# Patient Record
Sex: MALE | Race: WHITE | HISPANIC OR LATINO | RURAL
[De-identification: names, ages, dates, MRNs, and addresses within clinical notes are randomized per-mention and may not be internally consistent; named-entity substitution may affect disease eponyms.]

---

## 2021-04-07 ENCOUNTER — OFFICE VISIT (OUTPATIENT)
Dept: RURAL CLINIC 8 | Facility: CLINIC | Age: 46
End: 2021-04-07
Payer: MEDICARE

## 2021-04-07 ENCOUNTER — DASHBOARD ENCOUNTERS (OUTPATIENT)
Age: 46
End: 2021-04-07

## 2021-04-07 DIAGNOSIS — K22.2 ESOPHAGEAL STRICTURE: ICD-10-CM

## 2021-04-07 DIAGNOSIS — K20.0 EOSINOPHILIC ESOPHAGITIS: ICD-10-CM

## 2021-04-07 DIAGNOSIS — R13.10 ESOPHAGEAL DYSPHAGIA: ICD-10-CM

## 2021-04-07 PROBLEM — 63305008: Status: ACTIVE | Noted: 2021-04-07

## 2021-04-07 PROBLEM — 40890009: Status: ACTIVE | Noted: 2021-04-07

## 2021-04-07 PROBLEM — 235599003: Status: ACTIVE | Noted: 2021-04-07

## 2021-04-07 PROCEDURE — 99203 OFFICE O/P NEW LOW 30 MIN: CPT | Performed by: INTERNAL MEDICINE

## 2021-04-07 RX ORDER — METAXALONE 800 MG/1
1 TABLET TABLET ORAL THREE TIMES A DAY
Status: ACTIVE | COMMUNITY

## 2021-04-07 RX ORDER — DICLOFENAC SODIUM 75 MG/1
1 TABLET TABLET, DELAYED RELEASE ORAL TWICE A DAY
Status: ACTIVE | COMMUNITY

## 2021-04-07 RX ORDER — IBUPROFEN 800 MG/1
1 TABLET WITH FOOD OR MILK AS NEEDED TABLET ORAL THREE TIMES A DAY
Status: ACTIVE | COMMUNITY

## 2021-04-07 RX ORDER — PANTOPRAZOLE SODIUM 40 MG/1
1 TABLET TABLET, DELAYED RELEASE ORAL ONCE A DAY
Qty: 90 TABLET | Refills: 3 | OUTPATIENT
Start: 2021-04-07

## 2021-04-07 RX ORDER — TRAMADOL HYDROCHLORIDE 50 MG/1
1 TABLET AS NEEDED TABLET, FILM COATED ORAL ONCE A DAY
Status: ACTIVE | COMMUNITY

## 2021-04-07 NOTE — HPI-TODAY'S VISIT:
patient underwent upper GI endoscopy in February 2021 secondary to esophageal dysphagia.  From reviewing the report, it seems that he had distal diffuse esophageal narrowing.  Biopsies showed eosinophilic esophagitis.  From discussing this with the patient, he has had problems with this for many years. He would have problems with this since he was a small child. - he relates the example of as a small boy, he got a piece of steak stuck in the esophagus. had to be taken to the ER. he says that things get stuck in his esophagus just about all the time. -